# Patient Record
Sex: FEMALE | Race: WHITE | HISPANIC OR LATINO
[De-identification: names, ages, dates, MRNs, and addresses within clinical notes are randomized per-mention and may not be internally consistent; named-entity substitution may affect disease eponyms.]

---

## 2020-10-01 ENCOUNTER — OFFICE VISIT (OUTPATIENT)
Age: 55
End: 2020-10-01

## 2020-10-26 ENCOUNTER — OFFICE VISIT (OUTPATIENT)
Dept: URBAN - METROPOLITAN AREA CLINIC 7 | Facility: CLINIC | Age: 55
End: 2020-10-26

## 2020-10-27 ENCOUNTER — TELEPHONE ENCOUNTER (OUTPATIENT)
Dept: URBAN - METROPOLITAN AREA CLINIC 9 | Facility: CLINIC | Age: 55
End: 2020-10-27

## 2020-11-02 ENCOUNTER — OFFICE VISIT (OUTPATIENT)
Dept: URBAN - METROPOLITAN AREA CLINIC 7 | Facility: CLINIC | Age: 55
End: 2020-11-02

## 2020-11-11 ENCOUNTER — TELEPHONE ENCOUNTER (OUTPATIENT)
Dept: URBAN - METROPOLITAN AREA CLINIC 9 | Facility: CLINIC | Age: 55
End: 2020-11-11

## 2020-11-11 ENCOUNTER — OFFICE VISIT (OUTPATIENT)
Dept: URBAN - METROPOLITAN AREA SURGERY CENTER 5 | Facility: SURGERY CENTER | Age: 55
End: 2020-11-11

## 2020-11-19 ENCOUNTER — OFFICE VISIT (OUTPATIENT)
Dept: URBAN - METROPOLITAN AREA SURGERY CENTER 5 | Facility: SURGERY CENTER | Age: 55
End: 2020-11-19

## 2020-12-04 ENCOUNTER — OFFICE VISIT (OUTPATIENT)
Dept: URBAN - METROPOLITAN AREA SURGERY CENTER 5 | Facility: SURGERY CENTER | Age: 55
End: 2020-12-04

## 2020-12-28 ENCOUNTER — TELEPHONE ENCOUNTER (OUTPATIENT)
Dept: URBAN - METROPOLITAN AREA CLINIC 9 | Facility: CLINIC | Age: 55
End: 2020-12-28

## 2021-01-02 ENCOUNTER — TELEPHONE ENCOUNTER (OUTPATIENT)
Dept: URBAN - METROPOLITAN AREA CLINIC 9 | Facility: CLINIC | Age: 56
End: 2021-01-02

## 2021-01-06 ENCOUNTER — LAB OUTSIDE AN ENCOUNTER (OUTPATIENT)
Age: 56
End: 2021-01-06

## 2021-01-11 ENCOUNTER — LAB OUTSIDE AN ENCOUNTER (OUTPATIENT)
Age: 56
End: 2021-01-11

## 2021-01-12 ENCOUNTER — OFFICE VISIT (OUTPATIENT)
Dept: URBAN - METROPOLITAN AREA CLINIC 7 | Facility: CLINIC | Age: 56
End: 2021-01-12

## 2021-01-12 LAB — HELICOBACTER PYLORI AG, EIA, STOOL: (no result)

## 2021-01-13 LAB
CLASS: (no result)
GLIADIN (DEAMIDATED) AB (IGG): (no result)
IMMUNOGLOBULIN A: (no result)
INTERPRETATION: (no result)
TISSUE TRANSGLUTAMINASE AB, IGA: (no result)
WHEAT (F4) IGE: (no result)

## 2021-01-15 ENCOUNTER — OFFICE VISIT (OUTPATIENT)
Dept: URBAN - METROPOLITAN AREA CLINIC 7 | Facility: CLINIC | Age: 56
End: 2021-01-15

## 2021-01-27 ENCOUNTER — OFFICE VISIT (OUTPATIENT)
Dept: URBAN - METROPOLITAN AREA CLINIC 7 | Facility: CLINIC | Age: 56
End: 2021-01-27

## 2021-02-01 ENCOUNTER — OFFICE VISIT (OUTPATIENT)
Dept: URBAN - METROPOLITAN AREA CLINIC 7 | Facility: CLINIC | Age: 56
End: 2021-02-01

## 2021-02-19 ENCOUNTER — OFFICE VISIT (OUTPATIENT)
Dept: URBAN - METROPOLITAN AREA CLINIC 7 | Facility: CLINIC | Age: 56
End: 2021-02-19

## 2021-03-16 ENCOUNTER — TELEPHONE ENCOUNTER (OUTPATIENT)
Dept: URBAN - METROPOLITAN AREA CLINIC 9 | Facility: CLINIC | Age: 56
End: 2021-03-16

## 2021-09-01 ENCOUNTER — OFFICE VISIT (OUTPATIENT)
Age: 56
End: 2021-09-01

## 2021-11-01 ENCOUNTER — TELEPHONE ENCOUNTER (OUTPATIENT)
Dept: URBAN - METROPOLITAN AREA CLINIC 9 | Facility: CLINIC | Age: 56
End: 2021-11-01

## 2021-11-15 ENCOUNTER — OFFICE VISIT (OUTPATIENT)
Dept: URBAN - METROPOLITAN AREA CLINIC 7 | Facility: CLINIC | Age: 56
End: 2021-11-15

## 2021-11-15 ENCOUNTER — TELEPHONE ENCOUNTER (OUTPATIENT)
Dept: URBAN - METROPOLITAN AREA CLINIC 9 | Facility: CLINIC | Age: 56
End: 2021-11-15

## 2021-11-22 ENCOUNTER — TELEPHONE ENCOUNTER (OUTPATIENT)
Dept: URBAN - METROPOLITAN AREA CLINIC 9 | Facility: CLINIC | Age: 56
End: 2021-11-22

## 2021-12-01 ENCOUNTER — TELEPHONE ENCOUNTER (OUTPATIENT)
Dept: URBAN - METROPOLITAN AREA CLINIC 9 | Facility: CLINIC | Age: 56
End: 2021-12-01

## 2022-03-01 ENCOUNTER — TELEPHONE ENCOUNTER (OUTPATIENT)
Dept: URBAN - METROPOLITAN AREA CLINIC 9 | Facility: CLINIC | Age: 57
End: 2022-03-01

## 2022-07-30 ENCOUNTER — TELEPHONE ENCOUNTER (OUTPATIENT)
Age: 57
End: 2022-07-30

## 2022-07-30 RX ORDER — METRONIDAZOLE 250 MG/1
1 (ONE) TABLET ORAL
Qty: 0 | Refills: 2 | OUTPATIENT
Start: 2020-12-28 | End: 2021-01-11

## 2022-07-30 RX ORDER — PANTOPRAZOLE SODIUM 40 MG/1
1 (ONE) TABLET, DELAYED RELEASE ORAL
Qty: 0 | Refills: 2 | OUTPATIENT
Start: 2020-12-28 | End: 2021-01-11

## 2022-07-30 RX ORDER — DOXYCYCLINE HYCLATE 100 MG/1
1 (ONE) CAPSULE ORAL
Qty: 0 | Refills: 2 | OUTPATIENT
Start: 2020-12-28 | End: 2021-01-11

## 2022-07-30 RX ORDER — BISMUTH SUBSALICYLATE 262 MG/1
2 (TWO) TABLET, CHEWABLE ORAL
Qty: 0 | Refills: 2 | OUTPATIENT
Start: 2020-12-28 | End: 2021-01-11

## 2022-07-31 ENCOUNTER — TELEPHONE ENCOUNTER (OUTPATIENT)
Age: 57
End: 2022-07-31

## 2022-07-31 RX ORDER — RABEPRAZOLE SODIUM 20 MG/1
1 TABLET, DELAYED RELEASE ORAL
Qty: 0 | Refills: 16 | Status: ACTIVE | COMMUNITY
Start: 2021-12-01

## 2022-07-31 RX ORDER — DICYCLOMINE HYDROCHLORIDE 10 MG/1
1 (ONE) CAPSULE ORAL
Qty: 0 | Refills: 16 | Status: ACTIVE | COMMUNITY
Start: 2020-10-26

## 2022-07-31 RX ORDER — BISMUTH SUBSALICYLATE 262 MG/1
2 (TWO) TABLET, CHEWABLE ORAL
Qty: 0 | Refills: 2 | Status: ACTIVE | COMMUNITY
Start: 2020-12-28

## 2022-07-31 RX ORDER — DICYCLOMINE HYDROCHLORIDE 10 MG/1
1 (ONE) CAPSULE ORAL
Qty: 0 | Refills: 5 | Status: ACTIVE | COMMUNITY
Start: 2021-10-31

## 2022-07-31 RX ORDER — DICYCLOMINE HYDROCHLORIDE 10 MG/1
1 (ONE) CAPSULE ORAL
Qty: 0 | Refills: 2 | Status: ACTIVE | COMMUNITY
Start: 2022-03-01

## 2022-07-31 RX ORDER — METRONIDAZOLE 250 MG/1
1 (ONE) TABLET ORAL
Qty: 0 | Refills: 2 | Status: ACTIVE | COMMUNITY
Start: 2020-12-28

## 2022-07-31 RX ORDER — DOXYCYCLINE HYCLATE 100 MG/1
1 (ONE) CAPSULE ORAL
Qty: 0 | Refills: 2 | Status: ACTIVE | COMMUNITY
Start: 2020-12-28

## 2022-07-31 RX ORDER — PANTOPRAZOLE SODIUM 40 MG/1
1 (ONE) TABLET, DELAYED RELEASE ORAL
Qty: 0 | Refills: 2 | Status: ACTIVE | COMMUNITY
Start: 2020-12-28

## 2023-10-12 ENCOUNTER — TELEPHONE ENCOUNTER (OUTPATIENT)
Dept: URBAN - METROPOLITAN AREA CLINIC 7 | Facility: CLINIC | Age: 58
End: 2023-10-12

## 2023-10-12 VITALS — WEIGHT: 186 LBS | BODY MASS INDEX: 32.96 KG/M2 | HEIGHT: 63 IN

## 2023-12-04 ENCOUNTER — LAB OUTSIDE AN ENCOUNTER (OUTPATIENT)
Dept: URBAN - METROPOLITAN AREA CLINIC 7 | Facility: CLINIC | Age: 58
End: 2023-12-04

## 2023-12-04 ENCOUNTER — OFFICE VISIT (OUTPATIENT)
Dept: URBAN - METROPOLITAN AREA CLINIC 7 | Facility: CLINIC | Age: 58
End: 2023-12-04
Payer: COMMERCIAL

## 2023-12-04 VITALS
DIASTOLIC BLOOD PRESSURE: 70 MMHG | RESPIRATION RATE: 16 BRPM | SYSTOLIC BLOOD PRESSURE: 130 MMHG | HEIGHT: 63 IN | WEIGHT: 188 LBS | BODY MASS INDEX: 33.31 KG/M2 | TEMPERATURE: 97.7 F

## 2023-12-04 DIAGNOSIS — Z86.19 HISTORY OF HELICOBACTER PYLORI INFECTION: ICD-10-CM

## 2023-12-04 DIAGNOSIS — R14.0 BLOATING: ICD-10-CM

## 2023-12-04 DIAGNOSIS — R10.13 DYSPEPSIA: ICD-10-CM

## 2023-12-04 DIAGNOSIS — N20.0 KIDNEY STONE ON RIGHT SIDE: ICD-10-CM

## 2023-12-04 DIAGNOSIS — K58.2 MIXED IRRITABLE BOWEL SYNDROME: ICD-10-CM

## 2023-12-04 DIAGNOSIS — K57.90 DIVERTICULOSIS: ICD-10-CM

## 2023-12-04 DIAGNOSIS — K86.2 PANCREATIC CYST: ICD-10-CM

## 2023-12-04 PROCEDURE — 99214 OFFICE O/P EST MOD 30 MIN: CPT | Performed by: INTERNAL MEDICINE

## 2023-12-04 RX ORDER — RABEPRAZOLE SODIUM 20 MG/1
1 TABLET, DELAYED RELEASE ORAL
Qty: 0 | Refills: 16 | Status: DISCONTINUED | COMMUNITY
Start: 2021-12-01

## 2023-12-04 RX ORDER — PANTOPRAZOLE SODIUM 40 MG/1
1 (ONE) TABLET, DELAYED RELEASE ORAL
Qty: 0 | Refills: 2 | Status: DISCONTINUED | COMMUNITY
Start: 2020-12-28

## 2023-12-04 RX ORDER — DOXYCYCLINE HYCLATE 100 MG/1
1 (ONE) CAPSULE ORAL
Qty: 0 | Refills: 2 | Status: DISCONTINUED | COMMUNITY
Start: 2020-12-28

## 2023-12-04 RX ORDER — NEOMYCIN SULFATE, POLYMYXIN B SULFATE, HYDROCORTISONE 3.5; 10000; 1 MG/ML; [USP'U]/ML; MG/ML
USE 3 DROPS IN EACH EAR THREE TIMES DAILY SOLUTION/ DROPS AURICULAR (OTIC)
Qty: 10 MILLILITER | Refills: 0 | Status: DISCONTINUED | COMMUNITY

## 2023-12-04 RX ORDER — METRONIDAZOLE 250 MG/1
1 (ONE) TABLET ORAL
Qty: 0 | Refills: 2 | Status: DISCONTINUED | COMMUNITY
Start: 2020-12-28

## 2023-12-04 RX ORDER — SOLIFENACIN SUCCINATE 5 MG/1
TAKE 1 TABLET BY MOUTH EVERY DAY AS DIRECTED TABLET, FILM COATED ORAL
Qty: 90 EACH | Refills: 0 | Status: DISCONTINUED | COMMUNITY

## 2023-12-04 RX ORDER — PNV NO.95/FERROUS FUM/FOLIC AC 28MG-0.8MG
AS DIRECTED TABLET ORAL
Status: ACTIVE | COMMUNITY

## 2023-12-04 RX ORDER — AMITRIPTYLINE HYDROCHLORIDE 25 MG/1
TAKE 1 TABLET BY MOUTH EVERY NIGHT TABLET, FILM COATED ORAL
Qty: 90 EACH | Refills: 0 | Status: ACTIVE | COMMUNITY

## 2023-12-04 RX ORDER — MONTELUKAST SODIUM 10 MG/1
TABLET, FILM COATED ORAL
Qty: 90 TABLET | Status: ACTIVE | COMMUNITY

## 2023-12-04 RX ORDER — BUSPIRONE HYDROCHLORIDE 15 MG/1
TAKE 1 TABLET BY MOUTH TWICE DAILY TABLET ORAL
Qty: 60 EACH | Refills: 0 | Status: ACTIVE | COMMUNITY

## 2023-12-04 RX ORDER — METOPROLOL SUCCINATE 50 MG/1
TABLET, FILM COATED, EXTENDED RELEASE ORAL
Qty: 90 TABLET | Status: ACTIVE | COMMUNITY

## 2023-12-04 RX ORDER — CLOTRIMAZOLE AND BETAMETHASONE DIPROPIONATE 10; .5 MG/G; MG/G
APPLY TOPICALLY TO THE AFFECTED AND SURROUNDING AREAS TWICE DAILY IN THE MORNING AND IN THE EVENING FOR 2 WEEKS CREAM TOPICAL
Qty: 45 GRAM | Refills: 0 | Status: DISCONTINUED | COMMUNITY

## 2023-12-04 RX ORDER — ACETIC ACID 20.65 MG/ML
SOLUTION AURICULAR (OTIC)
Qty: 15 MILLILITER | Status: DISCONTINUED | COMMUNITY

## 2023-12-04 RX ORDER — BISMUTH SUBSALICYLATE 262 MG/1
2 (TWO) TABLET, CHEWABLE ORAL
Qty: 0 | Refills: 2 | Status: DISCONTINUED | COMMUNITY
Start: 2020-12-28

## 2023-12-04 RX ORDER — FAMOTIDINE 40 MG/1
TABLET, FILM COATED ORAL
Qty: 90 TABLET | Status: ACTIVE | COMMUNITY

## 2023-12-04 RX ORDER — TRAZODONE HYDROCHLORIDE 150 MG/1
TABLET ORAL
Qty: 180 TABLET | Status: ACTIVE | COMMUNITY

## 2023-12-04 RX ORDER — AMLODIPINE BESYLATE 5 MG/1
TABLET ORAL
Qty: 90 TABLET | Status: ACTIVE | COMMUNITY

## 2023-12-04 RX ORDER — OMEPRAZOLE 40 MG/1
CAPSULE, DELAYED RELEASE ORAL
Qty: 90 CAPSULE | Status: ACTIVE | COMMUNITY

## 2023-12-04 RX ORDER — OXCARBAZEPINE 150 MG/1
TABLET, FILM COATED ORAL
Qty: 180 TABLET | Status: DISCONTINUED | COMMUNITY

## 2023-12-04 RX ORDER — DICYCLOMINE HYDROCHLORIDE 10 MG/1
1 (ONE) CAPSULE ORAL
Qty: 0 | Refills: 2 | Status: DISCONTINUED | COMMUNITY
Start: 2022-03-01

## 2023-12-04 RX ORDER — LORAZEPAM 0.5 MG/1
TABLET ORAL
Qty: 90 TABLET | Status: ACTIVE | COMMUNITY

## 2023-12-04 NOTE — HPI-TODAY'S VISIT:
Patient was last seen in the office in November 2021.  She has been evaluated in the past for diverticulitis, as well as chronic dyspeptic symptoms and reflux.  Also has a history of colon polyps.  Colonoscopy in January 2020 with 2 polyps removed and repeat recommended in 5 years.  She has been treated with PPI, H2 receptor blockers, and sucralfate in the past.  CT scan in the hospital demonstrated a cecal diverticulum that was inflamed back in 2020 but the rest of her CT scan appeared normal.  Overall, also had chronic irritable bowel symptoms.  EGD December 2020 with a small hiatal hernia, granular stomach, small mucosal papule, normal duodenum, and biopsies with H. pylori and gastric intestinal metaplasia.  Her H. pylori was treated and negative in January 2021 but was checked too soon.  HIDA scan normal.  Mild wheat allergy and negative for celiac disease.  Her last visit, she was evaluated for lower abdominal pain which was persistent, cramping, bloating, nausea, and alternating bowel habits.  She was on PPI and famotidine but still was getting reflux symptoms.  Was also taking peppermint, Beano for gas, and dicyclomine.  Plan at that time was to recheck her H. pylori test, gluten-free diet, Beano with meals, SIBO testing, gallbladder ultrasound for surveillance of gallbladder polyps, as well as a CT abdomen pelvis given right lower quadrant abdominal pain.  Also want to use Benefiber and peppermint.  Overall picture was that of irritable bowel syndrome mixed type with functional bloating but wanted to do an evaluation to exclude other causes.  CT scan, November 2021, demonstrated no evidence of bowel inflammation, normal pancreas, no enlarged lymph nodes but did have kidney stones measuring up to 1 cm in the right kidney, an indeterminate adrenal lesion measuring 1.3 cm, and no left kidney.  I did advise referral to urology given her kidney stone as well as getting an MRI adrenal protocol for the adrenal lesion.  MRI abdomen December 2021 demonstrated benign adrenal lesions requiring no further follow-up and a small pancreatic cyst requiring an MRI pancreas for surveillance 1 year.  It is not clear that her H. pylori test was ever done, nor that SIBO testing was completed.  Follow-up now.  She is having abdominal colic every day, but no urges to have BM, epigastric discomfort, alternating bowel habits. She still has renal stones on the right side. Having multiple symptoms. She is on amitriptyline for migraines. Weight is stable.

## 2023-12-20 ENCOUNTER — CLAIMS CREATED FROM THE CLAIM WINDOW (OUTPATIENT)
Dept: URBAN - METROPOLITAN AREA SURGERY CENTER 5 | Facility: SURGERY CENTER | Age: 58
End: 2023-12-20
Payer: COMMERCIAL

## 2023-12-20 ENCOUNTER — TELEPHONE ENCOUNTER (OUTPATIENT)
Dept: URBAN - METROPOLITAN AREA CLINIC 7 | Facility: CLINIC | Age: 58
End: 2023-12-20

## 2023-12-20 ENCOUNTER — CLAIMS CREATED FROM THE CLAIM WINDOW (OUTPATIENT)
Dept: URBAN - METROPOLITAN AREA CLINIC 4 | Facility: CLINIC | Age: 58
End: 2023-12-20
Payer: COMMERCIAL

## 2023-12-20 DIAGNOSIS — K31.89 OTHER DISEASES OF STOMACH AND DUODENUM: ICD-10-CM

## 2023-12-20 DIAGNOSIS — K31.A0 GASTRIC INTESTINAL METAPLASIA, UNSPECIFIED: ICD-10-CM

## 2023-12-20 DIAGNOSIS — K29.70 GASTRITIS, UNSPECIFIED, WITHOUT BLEEDING: ICD-10-CM

## 2023-12-20 DIAGNOSIS — K29.60 OTHER GASTRITIS WITHOUT BLEEDING: ICD-10-CM

## 2023-12-20 DIAGNOSIS — R13.10 DYSPHAGIA, UNSPECIFIED TYPE: ICD-10-CM

## 2023-12-20 DIAGNOSIS — K31.89 REACTIVE GASTROPATHY: ICD-10-CM

## 2023-12-20 DIAGNOSIS — R13.19 DYSPHAGIA: ICD-10-CM

## 2023-12-20 PROCEDURE — 88342 IMHCHEM/IMCYTCHM 1ST ANTB: CPT | Performed by: PATHOLOGY

## 2023-12-20 PROCEDURE — 00731 ANES UPR GI NDSC PX NOS: CPT | Performed by: NURSE ANESTHETIST, CERTIFIED REGISTERED

## 2023-12-20 PROCEDURE — 43239 EGD BIOPSY SINGLE/MULTIPLE: CPT | Performed by: INTERNAL MEDICINE

## 2023-12-20 PROCEDURE — 88305 TISSUE EXAM BY PATHOLOGIST: CPT | Performed by: PATHOLOGY

## 2023-12-20 PROCEDURE — 43248 EGD GUIDE WIRE INSERTION: CPT | Performed by: INTERNAL MEDICINE

## 2023-12-20 PROCEDURE — 88312 SPECIAL STAINS GROUP 1: CPT | Performed by: PATHOLOGY

## 2023-12-20 RX ORDER — PNV NO.95/FERROUS FUM/FOLIC AC 28MG-0.8MG
AS DIRECTED TABLET ORAL
Status: ACTIVE | COMMUNITY

## 2023-12-20 RX ORDER — LORAZEPAM 0.5 MG/1
TABLET ORAL
Qty: 90 TABLET | Status: ACTIVE | COMMUNITY

## 2023-12-20 RX ORDER — BUSPIRONE HYDROCHLORIDE 15 MG/1
TAKE 1 TABLET BY MOUTH TWICE DAILY TABLET ORAL
Qty: 60 EACH | Refills: 0 | Status: ACTIVE | COMMUNITY

## 2023-12-20 RX ORDER — TRAZODONE HYDROCHLORIDE 150 MG/1
TABLET ORAL
Qty: 180 TABLET | Status: ACTIVE | COMMUNITY

## 2023-12-20 RX ORDER — AMLODIPINE BESYLATE 5 MG/1
TABLET ORAL
Qty: 90 TABLET | Status: ACTIVE | COMMUNITY

## 2023-12-20 RX ORDER — OMEPRAZOLE 40 MG/1
CAPSULE, DELAYED RELEASE ORAL
Qty: 90 CAPSULE | Status: ACTIVE | COMMUNITY

## 2023-12-20 RX ORDER — MONTELUKAST SODIUM 10 MG/1
TABLET, FILM COATED ORAL
Qty: 90 TABLET | Status: ACTIVE | COMMUNITY

## 2023-12-20 RX ORDER — DICYCLOMINE HYDROCHLORIDE 10 MG/1
AS DIRECTED CAPSULE ORAL
Qty: 60 | Refills: 3 | OUTPATIENT
Start: 2023-12-20 | End: 2024-04-18

## 2023-12-20 RX ORDER — AMITRIPTYLINE HYDROCHLORIDE 25 MG/1
TAKE 1 TABLET BY MOUTH EVERY NIGHT TABLET, FILM COATED ORAL
Qty: 90 EACH | Refills: 0 | Status: ACTIVE | COMMUNITY

## 2023-12-20 RX ORDER — METOPROLOL SUCCINATE 50 MG/1
TABLET, FILM COATED, EXTENDED RELEASE ORAL
Qty: 90 TABLET | Status: ACTIVE | COMMUNITY

## 2023-12-20 RX ORDER — FAMOTIDINE 40 MG/1
TABLET, FILM COATED ORAL
Qty: 90 TABLET | Status: ACTIVE | COMMUNITY

## 2023-12-21 ENCOUNTER — TELEPHONE ENCOUNTER (OUTPATIENT)
Dept: URBAN - METROPOLITAN AREA CLINIC 7 | Facility: CLINIC | Age: 58
End: 2023-12-21

## 2023-12-28 ENCOUNTER — TELEPHONE ENCOUNTER (OUTPATIENT)
Dept: URBAN - METROPOLITAN AREA CLINIC 7 | Facility: CLINIC | Age: 58
End: 2023-12-28

## 2024-01-16 ENCOUNTER — TELEPHONE ENCOUNTER (OUTPATIENT)
Dept: URBAN - METROPOLITAN AREA CLINIC 9 | Facility: CLINIC | Age: 59
End: 2024-01-16

## 2024-01-18 ENCOUNTER — TELEPHONE ENCOUNTER (OUTPATIENT)
Dept: URBAN - METROPOLITAN AREA SURGERY CENTER 9 | Facility: SURGERY CENTER | Age: 59
End: 2024-01-18

## 2024-01-19 ENCOUNTER — OFFICE VISIT (OUTPATIENT)
Dept: URBAN - METROPOLITAN AREA SURGERY CENTER 9 | Facility: SURGERY CENTER | Age: 59
End: 2024-01-19

## 2024-01-29 ENCOUNTER — CLAIMS CREATED FROM THE CLAIM WINDOW (OUTPATIENT)
Dept: URBAN - METROPOLITAN AREA SURGERY CENTER 9 | Facility: SURGERY CENTER | Age: 59
End: 2024-01-29
Payer: COMMERCIAL

## 2024-01-29 ENCOUNTER — CLAIMS CREATED FROM THE CLAIM WINDOW (OUTPATIENT)
Dept: URBAN - METROPOLITAN AREA CLINIC 4 | Facility: CLINIC | Age: 59
End: 2024-01-29
Payer: COMMERCIAL

## 2024-01-29 DIAGNOSIS — K29.60 OTHER GASTRITIS WITHOUT BLEEDING: ICD-10-CM

## 2024-01-29 DIAGNOSIS — K31.89 OTHER DISEASES OF STOMACH AND DUODENUM: ICD-10-CM

## 2024-01-29 DIAGNOSIS — K29.70 GASTRITIS WITHOUT BLEEDING, UNSPECIFIED CHRONICITY, UNSPECIFIED GASTRITIS TYPE: ICD-10-CM

## 2024-01-29 PROCEDURE — 43237 ENDOSCOPIC US EXAM ESOPH: CPT | Performed by: INTERNAL MEDICINE

## 2024-01-29 PROCEDURE — 00731 ANES UPR GI NDSC PX NOS: CPT | Performed by: NURSE ANESTHETIST, CERTIFIED REGISTERED

## 2024-01-29 PROCEDURE — 88341 IMHCHEM/IMCYTCHM EA ADD ANTB: CPT | Performed by: PATHOLOGY

## 2024-01-29 PROCEDURE — 88342 IMHCHEM/IMCYTCHM 1ST ANTB: CPT | Performed by: PATHOLOGY

## 2024-01-29 PROCEDURE — 88305 TISSUE EXAM BY PATHOLOGIST: CPT | Performed by: PATHOLOGY

## 2024-01-29 RX ORDER — AMITRIPTYLINE HYDROCHLORIDE 25 MG/1
TAKE 1 TABLET BY MOUTH EVERY NIGHT TABLET, FILM COATED ORAL
Qty: 90 EACH | Refills: 0 | Status: ACTIVE | COMMUNITY

## 2024-01-29 RX ORDER — AMLODIPINE BESYLATE 5 MG/1
TABLET ORAL
Qty: 90 TABLET | Status: ACTIVE | COMMUNITY

## 2024-01-29 RX ORDER — BUSPIRONE HYDROCHLORIDE 15 MG/1
TAKE 1 TABLET BY MOUTH TWICE DAILY TABLET ORAL
Qty: 60 EACH | Refills: 0 | Status: ACTIVE | COMMUNITY

## 2024-01-29 RX ORDER — FAMOTIDINE 40 MG/1
TABLET, FILM COATED ORAL
Qty: 90 TABLET | Status: ACTIVE | COMMUNITY

## 2024-01-29 RX ORDER — TRAZODONE HYDROCHLORIDE 150 MG/1
TABLET ORAL
Qty: 180 TABLET | Status: ACTIVE | COMMUNITY

## 2024-01-29 RX ORDER — MONTELUKAST SODIUM 10 MG/1
TABLET, FILM COATED ORAL
Qty: 90 TABLET | Status: ACTIVE | COMMUNITY

## 2024-01-29 RX ORDER — OMEPRAZOLE 40 MG/1
CAPSULE, DELAYED RELEASE ORAL
Qty: 90 CAPSULE | Status: ACTIVE | COMMUNITY

## 2024-01-29 RX ORDER — DICYCLOMINE HYDROCHLORIDE 10 MG/1
AS DIRECTED CAPSULE ORAL
Qty: 60 | Refills: 3 | Status: ACTIVE | COMMUNITY
Start: 2023-12-20 | End: 2024-04-18

## 2024-01-29 RX ORDER — PNV NO.95/FERROUS FUM/FOLIC AC 28MG-0.8MG
AS DIRECTED TABLET ORAL
Status: ACTIVE | COMMUNITY

## 2024-01-29 RX ORDER — METOPROLOL SUCCINATE 50 MG/1
TABLET, FILM COATED, EXTENDED RELEASE ORAL
Qty: 90 TABLET | Status: ACTIVE | COMMUNITY

## 2024-01-29 RX ORDER — LORAZEPAM 0.5 MG/1
TABLET ORAL
Qty: 90 TABLET | Status: ACTIVE | COMMUNITY

## 2024-04-24 ENCOUNTER — OV EP (OUTPATIENT)
Dept: URBAN - METROPOLITAN AREA CLINIC 7 | Facility: CLINIC | Age: 59
End: 2024-04-24

## 2024-04-24 RX ORDER — AMLODIPINE BESYLATE 5 MG/1
TABLET ORAL
Qty: 90 TABLET | Status: ACTIVE | COMMUNITY

## 2024-04-24 RX ORDER — METOPROLOL SUCCINATE 50 MG/1
TABLET, FILM COATED, EXTENDED RELEASE ORAL
Qty: 90 TABLET | Status: ACTIVE | COMMUNITY

## 2024-04-24 RX ORDER — OMEPRAZOLE 40 MG/1
CAPSULE, DELAYED RELEASE ORAL
Qty: 90 CAPSULE | Status: ACTIVE | COMMUNITY

## 2024-04-24 RX ORDER — PNV NO.95/FERROUS FUM/FOLIC AC 28MG-0.8MG
AS DIRECTED TABLET ORAL
Status: ACTIVE | COMMUNITY

## 2024-04-24 RX ORDER — LORAZEPAM 0.5 MG/1
TABLET ORAL
Qty: 90 TABLET | Status: ACTIVE | COMMUNITY

## 2024-04-24 RX ORDER — BUSPIRONE HYDROCHLORIDE 15 MG/1
TAKE 1 TABLET BY MOUTH TWICE DAILY TABLET ORAL
Qty: 60 EACH | Refills: 0 | Status: ACTIVE | COMMUNITY

## 2024-04-24 RX ORDER — AMITRIPTYLINE HYDROCHLORIDE 25 MG/1
TAKE 1 TABLET BY MOUTH EVERY NIGHT TABLET, FILM COATED ORAL
Qty: 90 EACH | Refills: 0 | Status: ACTIVE | COMMUNITY

## 2024-04-24 RX ORDER — MONTELUKAST SODIUM 10 MG/1
TABLET, FILM COATED ORAL
Qty: 90 TABLET | Status: ACTIVE | COMMUNITY

## 2024-04-24 RX ORDER — TRAZODONE HYDROCHLORIDE 150 MG/1
TABLET ORAL
Qty: 180 TABLET | Status: ACTIVE | COMMUNITY

## 2024-04-24 RX ORDER — FAMOTIDINE 40 MG/1
TABLET, FILM COATED ORAL
Qty: 90 TABLET | Status: ACTIVE | COMMUNITY

## 2024-04-24 NOTE — HPI-TODAY'S VISIT:
LV 12/2023. She has been evaluated in the past for diverticulitis, as well as chronic dyspeptic symptoms and reflux.  Also has a history of colon polyps.  Colonoscopy in January 2020 with 2 polyps removed and repeat recommended in 5 years.  She has been treated with PPI, H2 receptor blockers, and sucralfate in the past.  CT scan in the hospital demonstrated a cecal diverticulum that was inflamed back in 2020 but the rest of her CT scan appeared normal.  Overall, also had chronic irritable bowel symptoms.  EGD December 2020 with a small hiatal hernia, granular stomach, small mucosal papule, normal duodenum, and biopsies with H. pylori and gastric intestinal metaplasia.  Her H. pylori was treated and negative in January 2021 but was checked too soon.  HIDA scan normal.  Mild wheat allergy and negative for celiac disease.  Her last visit, she was evaluated for lower abdominal pain which was persistent, cramping, bloating, nausea, and alternating bowel habits.  She was on PPI and famotidine but still was getting reflux symptoms.  Was also taking peppermint, Beano for gas, and dicyclomine.  Plan at that time was to recheck her H. pylori test, gluten-free diet, Beano with meals, SIBO testing, gallbladder ultrasound for surveillance of gallbladder polyps, as well as a CT abdomen pelvis given right lower quadrant abdominal pain.  Also want to use Benefiber and peppermint.  Overall picture was that of irritable bowel syndrome mixed type with functional bloating but wanted to do an evaluation to exclude other causes.  CT scan, November 2021, demonstrated no evidence of bowel inflammation, normal pancreas, no enlarged lymph nodes but did have kidney stones measuring up to 1 cm in the right kidney, an indeterminate adrenal lesion measuring 1.3 cm, and no left kidney.  I did advise referral to urology given her kidney stone as well as getting an MRI adrenal protocol for the adrenal lesion.  MRI abdomen December 2021 demonstrated benign adrenal lesions requiring no further follow-up and a small pancreatic cyst requiring an MRI pancreas for surveillance 1 year.  It is not clear that her H. pylori test was ever done, nor that SIBO testing was completed. LV, she was having abdominal colic every day, but no urges to have BM, epigastric discomfort, alternating bowel habits. She still has renal stones on the right side. Having multiple symptoms. She was on amitriptyline for migraines. Weight is stable. EGD December 2023 demonstrated normal esophagus with dilation performed to 51 [place], mild erosive gastropathy, 10 mm submucosal papule in the gastric antrum, and otherwise normal stomach with biopsies taken for intestinal metaplasia, normal duodenum.  She was sent for EUS for gastric nodule.  Biopsies were negative for EOE, and negative for H. pylori.  No intestinal metaplasia was seen.  EUS was done in January 2024 which demonstrated a mucosal-based nodule on EUS.  Biopsies taken which demonstrated foveolar hyperplasia.  MRI MRCP December 2023 demonstrated hepatic steatosis, nondilated gallbladder without stones, no choledocholithiasis, lipoma within the body of the pancreas measuring 1.1 cm, stable subcentimeter cyst in the pancreatic tail, absent left kidney, no enlarged lymph nodes, no bowel inflammation.

## 2024-04-24 NOTE — PHYSICAL EXAM CHEST:
no lesions, no deformities, no traumatic injuries, no significant scars are present, chest wall non-tender, no masses present, breathing is unlabored without accessory muscle use,normal breath sounds
25

## 2024-06-28 ENCOUNTER — DASHBOARD ENCOUNTERS (OUTPATIENT)
Age: 59
End: 2024-06-28

## 2024-07-08 ENCOUNTER — OFFICE VISIT (OUTPATIENT)
Dept: URBAN - METROPOLITAN AREA CLINIC 7 | Facility: CLINIC | Age: 59
End: 2024-07-08

## 2024-07-08 RX ORDER — TRAZODONE HYDROCHLORIDE 150 MG/1
TABLET ORAL
Qty: 180 TABLET | Status: ACTIVE | COMMUNITY

## 2024-07-08 RX ORDER — LORAZEPAM 0.5 MG/1
TABLET ORAL
Qty: 90 TABLET | Status: ACTIVE | COMMUNITY

## 2024-07-08 RX ORDER — PNV NO.95/FERROUS FUM/FOLIC AC 28MG-0.8MG
AS DIRECTED TABLET ORAL
Status: ACTIVE | COMMUNITY

## 2024-07-08 RX ORDER — FAMOTIDINE 40 MG/1
TABLET, FILM COATED ORAL
Qty: 90 TABLET | Status: ACTIVE | COMMUNITY

## 2024-07-08 RX ORDER — AMITRIPTYLINE HYDROCHLORIDE 25 MG/1
TAKE 1 TABLET BY MOUTH EVERY NIGHT TABLET, FILM COATED ORAL
Qty: 90 EACH | Refills: 0 | Status: ACTIVE | COMMUNITY

## 2024-07-08 RX ORDER — AMLODIPINE BESYLATE 5 MG/1
TABLET ORAL
Qty: 90 TABLET | Status: ACTIVE | COMMUNITY

## 2024-07-08 RX ORDER — MONTELUKAST SODIUM 10 MG/1
TABLET, FILM COATED ORAL
Qty: 90 TABLET | Status: ACTIVE | COMMUNITY

## 2024-07-08 RX ORDER — METOPROLOL SUCCINATE 50 MG/1
TABLET, FILM COATED, EXTENDED RELEASE ORAL
Qty: 90 TABLET | Status: ACTIVE | COMMUNITY

## 2024-07-08 RX ORDER — OMEPRAZOLE 40 MG/1
CAPSULE, DELAYED RELEASE ORAL
Qty: 90 CAPSULE | Status: ACTIVE | COMMUNITY

## 2024-07-08 RX ORDER — BUSPIRONE HYDROCHLORIDE 15 MG/1
TAKE 1 TABLET BY MOUTH TWICE DAILY TABLET ORAL
Qty: 60 EACH | Refills: 0 | Status: ACTIVE | COMMUNITY

## 2024-07-08 NOTE — HPI-TODAY'S VISIT:
LV 12/2023. She has been evaluated in the past for diverticulitis, as well as chronic dyspeptic symptoms and reflux.  Also has a history of colon polyps.  Colonoscopy in January 2020 with 2 polyps removed and repeat recommended in 5 years.  She has been treated with PPI, H2 receptor blockers, and sucralfate in the past.  CT scan in the hospital demonstrated a cecal diverticulum that was inflamed back in 2020 but the rest of her CT scan appeared normal.  Overall, also had chronic irritable bowel symptoms.  EGD December 2020 with a small hiatal hernia, granular stomach, small mucosal papule, normal duodenum, and biopsies with H. pylori and gastric intestinal metaplasia.  Her H. pylori was treated and negative in January 2021 but was checked too soon.  HIDA scan normal.  Mild wheat allergy and negative for celiac disease.  Her last visit, she was evaluated for lower abdominal pain which was persistent, cramping, bloating, nausea, and alternating bowel habits.  She was on PPI and famotidine but still was getting reflux symptoms.  Was also taking peppermint, Beano for gas, and dicyclomine.  Plan at that time was to recheck her H. pylori test, gluten-free diet, Beano with meals, SIBO testing, gallbladder ultrasound for surveillance of gallbladder polyps, as well as a CT abdomen pelvis given right lower quadrant abdominal pain.  Also want to use Benefiber and peppermint.  Overall picture was that of irritable bowel syndrome mixed type with functional bloating but wanted to do an evaluation to exclude other causes.  CT scan, November 2021, demonstrated no evidence of bowel inflammation, normal pancreas, no enlarged lymph nodes but did have kidney stones measuring up to 1 cm in the right kidney, an indeterminate adrenal lesion measuring 1.3 cm, and no left kidney.  I did advise referral to urology given her kidney stone as well as getting an MRI adrenal protocol for the adrenal lesion.  MRI abdomen December 2021 demonstrated benign adrenal lesions requiring no further follow-up and a small pancreatic cyst requiring an MRI pancreas for surveillance 1 year.  It is not clear that her H. pylori test was ever done, nor that SIBO testing was completed. LV, she was having abdominal colic every day, but no urges to have BM, epigastric discomfort, alternating bowel habits. She still has renal stones on the right side. Having multiple symptoms. She was on amitriptyline for migraines. Weight is stable. EGD December 2023 demonstrated normal esophagus with dilation performed to 51 [place], mild erosive gastropathy, 10 mm submucosal papule in the gastric antrum, and otherwise normal stomach with biopsies taken for intestinal metaplasia, normal duodenum.  She was sent for EUS for gastric nodule.  Biopsies were negative for EOE, and negative for H. pylori.  No intestinal metaplasia was seen.  EUS was done in January 2024 which demonstrated a mucosal-based nodule on EUS.  Biopsies taken which demonstrated foveolar hyperplasia.  MRI MRCP December 2023 demonstrated hepatic steatosis, nondilated gallbladder without stones, no choledocholithiasis, lipoma within the body of the pancreas measuring 1.1 cm, stable subcentimeter cyst in the pancreatic tail, absent left kidney, no enlarged lymph nodes, no bowel inflammation.

## 2024-08-07 ENCOUNTER — OFFICE VISIT (OUTPATIENT)
Dept: URBAN - METROPOLITAN AREA CLINIC 7 | Facility: CLINIC | Age: 59
End: 2024-08-07

## 2024-08-07 RX ORDER — METOPROLOL SUCCINATE 50 MG/1
TABLET, FILM COATED, EXTENDED RELEASE ORAL
Qty: 90 TABLET | Status: ACTIVE | COMMUNITY

## 2024-08-07 RX ORDER — BUSPIRONE HYDROCHLORIDE 15 MG/1
TAKE 1 TABLET BY MOUTH TWICE DAILY TABLET ORAL
Qty: 60 EACH | Refills: 0 | Status: ACTIVE | COMMUNITY

## 2024-08-07 RX ORDER — FAMOTIDINE 40 MG/1
TABLET, FILM COATED ORAL
Qty: 90 TABLET | Status: ACTIVE | COMMUNITY

## 2024-08-07 RX ORDER — TRAZODONE HYDROCHLORIDE 150 MG/1
TABLET ORAL
Qty: 180 TABLET | Status: ACTIVE | COMMUNITY

## 2024-08-07 RX ORDER — LORAZEPAM 0.5 MG/1
TABLET ORAL
Qty: 90 TABLET | Status: ACTIVE | COMMUNITY

## 2024-08-07 RX ORDER — MONTELUKAST SODIUM 10 MG/1
TABLET, FILM COATED ORAL
Qty: 90 TABLET | Status: ACTIVE | COMMUNITY

## 2024-08-07 RX ORDER — PNV NO.95/FERROUS FUM/FOLIC AC 28MG-0.8MG
AS DIRECTED TABLET ORAL
Status: ACTIVE | COMMUNITY

## 2024-08-07 RX ORDER — AMLODIPINE BESYLATE 5 MG/1
TABLET ORAL
Qty: 90 TABLET | Status: ACTIVE | COMMUNITY

## 2024-08-07 RX ORDER — AMITRIPTYLINE HYDROCHLORIDE 25 MG/1
TAKE 1 TABLET BY MOUTH EVERY NIGHT TABLET, FILM COATED ORAL
Qty: 90 EACH | Refills: 0 | Status: ACTIVE | COMMUNITY

## 2024-08-07 RX ORDER — OMEPRAZOLE 40 MG/1
CAPSULE, DELAYED RELEASE ORAL
Qty: 90 CAPSULE | Status: ACTIVE | COMMUNITY

## 2024-08-07 NOTE — HPI-TODAY'S VISIT:
LV 12/2023. She has been evaluated in the past for diverticulitis, as well as chronic dyspeptic symptoms and reflux.  Also has a history of colon polyps.  Colonoscopy in January 2020 with 2 polyps removed and repeat recommended in 5 years.  She has been treated with PPI, H2 receptor blockers, and sucralfate in the past.  CT scan in the hospital demonstrated a cecal diverticulum that was inflamed back in 2020 but the rest of her CT scan appeared normal.  Overall, also had chronic irritable bowel symptoms.  EGD December 2020 with a small hiatal hernia, granular stomach, small mucosal papule, normal duodenum, and biopsies with H. pylori and gastric intestinal metaplasia.  Her H. pylori was treated and negative in January 2021 but was checked too soon.  HIDA scan normal.  Mild wheat allergy and negative for celiac disease.  Her last visit, she was evaluated for lower abdominal pain which was persistent, cramping, bloating, nausea, and alternating bowel habits.  She was on PPI and famotidine but still was getting reflux symptoms.  Was also taking peppermint, Beano for gas, and dicyclomine.  Plan at that time was to recheck her H. pylori test, gluten-free diet, Beano with meals, SIBO testing, gallbladder ultrasound for surveillance of gallbladder polyps, as well as a CT abdomen pelvis given right lower quadrant abdominal pain.  Also want to use Benefiber and peppermint.  Overall picture was that of irritable bowel syndrome mixed type with functional bloating but wanted to do an evaluation to exclude other causes.  CT scan, November 2021, demonstrated no evidence of bowel inflammation, normal pancreas, no enlarged lymph nodes but did have kidney stones measuring up to 1 cm in the right kidney, an indeterminate adrenal lesion measuring 1.3 cm, and no left kidney.  I did advise referral to urology given her kidney stone as well as getting an MRI adrenal protocol for the adrenal lesion.  MRI abdomen December 2021 demonstrated benign adrenal lesions requiring no further follow-up and a small pancreatic cyst requiring an MRI pancreas for surveillance 1 year.  It is not clear that her H. pylori test was ever done, nor that SIBO testing was completed. LV, she was having abdominal colic every day, but no urges to have BM, epigastric discomfort, alternating bowel habits. She still has renal stones on the right side. Having multiple symptoms. She was on amitriptyline for migraines. Weight is stable. EGD December 2023 demonstrated normal esophagus with dilation performed to 51 [place], mild erosive gastropathy, 10 mm submucosal papule in the gastric antrum, and otherwise normal stomach with biopsies taken for intestinal metaplasia, normal duodenum.  She was sent for EUS for gastric nodule.  Biopsies were negative for EOE, and negative for H. pylori.  No intestinal metaplasia was seen.  EUS was done in January 2024 which demonstrated a mucosal-based nodule on EUS.  Biopsies taken which demonstrated foveolar hyperplasia.  MRI MRCP December 2023 demonstrated hepatic steatosis, nondilated gallbladder without stones, no choledocholithiasis, lipoma within the body of the pancreas measuring 1.1 cm, stable subcentimeter cyst in the pancreatic tail, absent left kidney, no enlarged lymph nodes, no bowel inflammation.

## 2024-08-27 ENCOUNTER — TELEPHONE ENCOUNTER (OUTPATIENT)
Dept: URBAN - METROPOLITAN AREA CLINIC 7 | Facility: CLINIC | Age: 59
End: 2024-08-27

## 2024-10-20 PROBLEM — 15627741000119108: Status: ACTIVE | Noted: 2024-10-20

## 2024-10-20 PROBLEM — 72519002: Status: ACTIVE | Noted: 2024-10-20

## 2024-10-21 ENCOUNTER — OFFICE VISIT (OUTPATIENT)
Dept: URBAN - METROPOLITAN AREA CLINIC 7 | Facility: CLINIC | Age: 59
End: 2024-10-21

## 2024-10-21 RX ORDER — LORAZEPAM 0.5 MG/1
TABLET ORAL
Qty: 90 TABLET | COMMUNITY

## 2024-10-21 RX ORDER — METOPROLOL SUCCINATE 50 MG/1
TABLET, FILM COATED, EXTENDED RELEASE ORAL
Qty: 90 TABLET | COMMUNITY

## 2024-10-21 RX ORDER — BUSPIRONE HYDROCHLORIDE 15 MG/1
TAKE 1 TABLET BY MOUTH TWICE DAILY TABLET ORAL
Qty: 60 EACH | Refills: 0 | COMMUNITY

## 2024-10-21 RX ORDER — TRAZODONE HYDROCHLORIDE 150 MG/1
TABLET ORAL
Qty: 180 TABLET | COMMUNITY

## 2024-10-21 RX ORDER — FAMOTIDINE 40 MG/1
TABLET, FILM COATED ORAL
Qty: 90 TABLET | COMMUNITY

## 2024-10-21 RX ORDER — PNV NO.95/FERROUS FUM/FOLIC AC 28MG-0.8MG
AS DIRECTED TABLET ORAL
COMMUNITY

## 2024-10-21 RX ORDER — AMLODIPINE BESYLATE 5 MG/1
TABLET ORAL
Qty: 90 TABLET | COMMUNITY

## 2024-10-21 RX ORDER — AMITRIPTYLINE HYDROCHLORIDE 25 MG/1
TAKE 1 TABLET BY MOUTH EVERY NIGHT TABLET, FILM COATED ORAL
Qty: 90 EACH | Refills: 0 | COMMUNITY

## 2024-10-21 RX ORDER — OMEPRAZOLE 40 MG/1
CAPSULE, DELAYED RELEASE ORAL
Qty: 90 CAPSULE | COMMUNITY

## 2024-10-21 RX ORDER — MONTELUKAST SODIUM 10 MG/1
TABLET, FILM COATED ORAL
Qty: 90 TABLET | COMMUNITY

## 2024-10-21 NOTE — HPI-TODAY'S VISIT:
LV 12/2023. She has been evaluated in the past for diverticulitis, as well as chronic dyspeptic symptoms and reflux.  Also has a history of colon polyps.  Colonoscopy in January 2020 with 2 polyps removed and repeat recommended in 5 years.  She has been treated with PPI, H2 receptor blockers, and sucralfate in the past.  CT scan in the hospital demonstrated a cecal diverticulum that was inflamed back in 2020 but the rest of her CT scan appeared normal.  Overall, also had chronic irritable bowel symptoms.  EGD December 2020 with a small hiatal hernia, granular stomach, small mucosal papule, normal duodenum, and biopsies with H. pylori and gastric intestinal metaplasia.  Her H. pylori was treated and negative in January 2021 but was checked too soon.  HIDA scan normal.  Mild wheat allergy and negative for celiac disease.  Her last visit, she was evaluated for lower abdominal pain which was persistent, cramping, bloating, nausea, and alternating bowel habits.  She was on PPI and famotidine but still was getting reflux symptoms.  Was also taking peppermint, Beano for gas, and dicyclomine.  Plan at that time was to recheck her H. pylori test, gluten-free diet, Beano with meals, SIBO testing, gallbladder ultrasound for surveillance of gallbladder polyps, as well as a CT abdomen pelvis given right lower quadrant abdominal pain.  Also want to use Benefiber and peppermint.  Overall picture was that of irritable bowel syndrome mixed type with functional bloating but wanted to do an evaluation to exclude other causes.  CT scan, November 2021, demonstrated no evidence of bowel inflammation, normal pancreas, no enlarged lymph nodes but did have kidney stones measuring up to 1 cm in the right kidney, an indeterminate adrenal lesion measuring 1.3 cm, and no left kidney.  I did advise referral to urology given her kidney stone as well as getting an MRI adrenal protocol for the adrenal lesion.  MRI abdomen December 2021 demonstrated benign adrenal lesions requiring no further follow-up and a small pancreatic cyst requiring an MRI pancreas for surveillance 1 year.  It is not clear that her H. pylori test was ever done, nor that SIBO testing was completed. LV, she was having abdominal colic every day, but no urges to have BM, epigastric discomfort, alternating bowel habits. She still has renal stones on the right side. Having multiple symptoms. She was on amitriptyline for migraines. Weight is stable.  EGD December 2023 demonstrated normal esophagus with dilation performed to 51 [place], mild erosive gastropathy, 10 mm submucosal papule in the gastric antrum, and otherwise normal stomach with biopsies taken for intestinal metaplasia, normal duodenum.  She was sent for EUS for gastric nodule.  Biopsies were negative for EOE, and negative for H. pylori.  No intestinal metaplasia was seen.  EUS was done in January 2024 which demonstrated a mucosal-based nodule on EUS.  Biopsies taken which demonstrated foveolar hyperplasia.  MRI MRCP December 2023 demonstrated hepatic steatosis, nondilated gallbladder without stones, no choledocholithiasis, lipoma within the body of the pancreas measuring 1.1 cm, stable subcentimeter cyst in the pancreatic tail, absent left kidney, no enlarged lymph nodes, no bowel inflammation. Plan after her EGD in 2023 was repeat in 3-5 yrs for gastric IM. FU now.

## 2025-01-07 ENCOUNTER — LAB OUTSIDE AN ENCOUNTER (OUTPATIENT)
Dept: URBAN - METROPOLITAN AREA CLINIC 7 | Facility: CLINIC | Age: 60
End: 2025-01-07

## 2025-01-07 ENCOUNTER — OFFICE VISIT (OUTPATIENT)
Dept: URBAN - METROPOLITAN AREA CLINIC 7 | Facility: CLINIC | Age: 60
End: 2025-01-07
Payer: COMMERCIAL

## 2025-01-07 VITALS
WEIGHT: 200 LBS | TEMPERATURE: 97.5 F | SYSTOLIC BLOOD PRESSURE: 130 MMHG | HEART RATE: 101 BPM | BODY MASS INDEX: 35.44 KG/M2 | RESPIRATION RATE: 16 BRPM | HEIGHT: 63 IN | DIASTOLIC BLOOD PRESSURE: 70 MMHG

## 2025-01-07 DIAGNOSIS — K76.0 HEPATIC STEATOSIS: ICD-10-CM

## 2025-01-07 DIAGNOSIS — R10.13 DYSPEPSIA: ICD-10-CM

## 2025-01-07 DIAGNOSIS — N20.0 KIDNEY STONE ON RIGHT SIDE: ICD-10-CM

## 2025-01-07 DIAGNOSIS — R14.0 BLOATING: ICD-10-CM

## 2025-01-07 DIAGNOSIS — Z86.19 HISTORY OF HELICOBACTER PYLORI INFECTION: ICD-10-CM

## 2025-01-07 DIAGNOSIS — K58.2 MIXED IRRITABLE BOWEL SYNDROME: ICD-10-CM

## 2025-01-07 DIAGNOSIS — Z86.0100 PERSONAL HISTORY OF COLONIC POLYPS: ICD-10-CM

## 2025-01-07 DIAGNOSIS — K31.A0 GASTRIC INTESTINAL METAPLASIA: ICD-10-CM

## 2025-01-07 DIAGNOSIS — K57.90 DIVERTICULOSIS: ICD-10-CM

## 2025-01-07 DIAGNOSIS — K86.2 PANCREATIC CYST: ICD-10-CM

## 2025-01-07 PROCEDURE — 99214 OFFICE O/P EST MOD 30 MIN: CPT | Performed by: INTERNAL MEDICINE

## 2025-01-07 RX ORDER — PNV NO.95/FERROUS FUM/FOLIC AC 28MG-0.8MG
AS DIRECTED TABLET ORAL
Status: ACTIVE | COMMUNITY

## 2025-01-07 RX ORDER — TRAZODONE HYDROCHLORIDE 150 MG/1
TABLET ORAL
Qty: 180 TABLET | Status: ACTIVE | COMMUNITY

## 2025-01-07 RX ORDER — METOPROLOL SUCCINATE 50 MG/1
TABLET, FILM COATED, EXTENDED RELEASE ORAL
Qty: 90 TABLET | Status: ACTIVE | COMMUNITY

## 2025-01-07 RX ORDER — AMLODIPINE BESYLATE 5 MG/1
TABLET ORAL
Qty: 90 TABLET | Status: ACTIVE | COMMUNITY

## 2025-01-07 RX ORDER — DICYCLOMINE HYDROCHLORIDE 10 MG/1
TAKE 1 CAPSULE BY MOUTH THREE TIMES DAILY AS NEEDED FOR ABDOMINAL FOR PAIN CAPSULE ORAL
Qty: 180 EACH | Refills: 0 | Status: ACTIVE | COMMUNITY

## 2025-01-07 RX ORDER — FAMOTIDINE 40 MG/1
TABLET, FILM COATED ORAL
Qty: 90 TABLET | Status: ACTIVE | COMMUNITY

## 2025-01-07 RX ORDER — BUSPIRONE HYDROCHLORIDE 15 MG/1
TAKE 1 TABLET BY MOUTH TWICE DAILY TABLET ORAL
Qty: 60 EACH | Refills: 0 | Status: ACTIVE | COMMUNITY

## 2025-01-07 RX ORDER — LORAZEPAM 0.5 MG/1
TABLET ORAL
Qty: 90 TABLET | Status: ACTIVE | COMMUNITY

## 2025-01-07 RX ORDER — OMEPRAZOLE 40 MG/1
CAPSULE, DELAYED RELEASE ORAL
Qty: 90 CAPSULE | Status: ACTIVE | COMMUNITY

## 2025-01-07 RX ORDER — AMITRIPTYLINE HYDROCHLORIDE 25 MG/1
TAKE 1 TABLET BY MOUTH EVERY NIGHT TABLET, FILM COATED ORAL
Qty: 90 EACH | Refills: 0 | Status: ACTIVE | COMMUNITY

## 2025-01-07 RX ORDER — DICYCLOMINE HYDROCHLORIDE 10 MG/1
2 CAPSULES CAPSULE ORAL
Qty: 180 | Refills: 3

## 2025-01-07 RX ORDER — MONTELUKAST SODIUM 10 MG/1
TABLET, FILM COATED ORAL
Qty: 90 TABLET | Status: ACTIVE | COMMUNITY

## 2025-01-07 NOTE — PHYSICAL EXAM HENT:
Head, normocephalic, atraumatic, Face, Face within normal limits, Ears, External ears within normal limits, Nose/Nasopharynx, External nose normal appearance, nares patent, no nasal discharge, Mouth and Throat, Oral cavity appearance normal, Lips, Appearance normal
If you are a smoker, it is important for your health to stop smoking. Please be aware that second hand smoke is also harmful.

## 2025-01-07 NOTE — HPI-TODAY'S VISIT:
LV 12/2023. She has been evaluated in the past for diverticulitis, as well as chronic dyspeptic symptoms and reflux.  Also has a history of colon polyps.  Colonoscopy in January 2020 with 2 polyps removed and repeat recommended in 5 years.  She has been treated with PPI, H2 receptor blockers, and sucralfate in the past.  CT scan in the hospital demonstrated a cecal diverticulum that was inflamed back in 2020 but the rest of her CT scan appeared normal.  Overall, also had chronic irritable bowel symptoms.  EGD December 2020 with a small hiatal hernia, granular stomach, small mucosal papule, normal duodenum, and biopsies with H. pylori and gastric intestinal metaplasia.  Her H. pylori was treated and negative in January 2021 but was checked too soon.  HIDA scan normal.  Mild wheat allergy and negative for celiac disease.  Her last visit, she was evaluated for lower abdominal pain which was persistent, cramping, bloating, nausea, and alternating bowel habits.  She was on PPI and famotidine but still was getting reflux symptoms.  Was also taking peppermint, Beano for gas, and dicyclomine.  Plan at that time was to recheck her H. pylori test, gluten-free diet, Beano with meals, SIBO testing, gallbladder ultrasound for surveillance of gallbladder polyps, as well as a CT abdomen pelvis given right lower quadrant abdominal pain.  Also want to use Benefiber and peppermint.  Overall picture was that of irritable bowel syndrome mixed type with functional bloating but wanted to do an evaluation to exclude other causes.  CT scan, November 2021, demonstrated no evidence of bowel inflammation, normal pancreas, no enlarged lymph nodes but did have kidney stones measuring up to 1 cm in the right kidney, an indeterminate adrenal lesion measuring 1.3 cm, and no left kidney.  I did advise referral to urology given her kidney stone as well as getting an MRI adrenal protocol for the adrenal lesion.  MRI abdomen December 2021 demonstrated benign adrenal lesions requiring no further follow-up and a small pancreatic cyst requiring an MRI pancreas for surveillance 1 year.  It is not clear that her H. pylori test was ever done, nor that SIBO testing was completed. LV, she was having abdominal colic every day, but no urges to have BM, epigastric discomfort, alternating bowel habits. She still has renal stones on the right side. Having multiple symptoms. She was on amitriptyline for migraines. Weight is stable.  EGD December 2023 demonstrated normal esophagus with dilation performed to 51 [place], mild erosive gastropathy, 10 mm submucosal papule in the gastric antrum, and otherwise normal stomach with biopsies taken for intestinal metaplasia, normal duodenum.  She was sent for EUS for gastric nodule.  Biopsies were negative for EOE, and negative for H. pylori.  No intestinal metaplasia was seen.  EUS was done in January 2024 which demonstrated a mucosal-based nodule on EUS.  Biopsies taken which demonstrated foveolar hyperplasia.  MRI MRCP December 2023 demonstrated hepatic steatosis, nondilated gallbladder without stones, no choledocholithiasis, lipoma within the body of the pancreas measuring 1.1 cm, stable subcentimeter cyst in the pancreatic tail, absent left kidney, no enlarged lymph nodes, no bowel inflammation. Plan after her EGD in 2023 was repeat in 3-5 yrs for gastric IM. FU now. She still continues bloating, distention. She does have some irregular bowel habits, more constipated now. No alarm symptoms. She is taking antacids as well.

## 2025-01-10 ENCOUNTER — TELEPHONE ENCOUNTER (OUTPATIENT)
Dept: URBAN - METROPOLITAN AREA CLINIC 7 | Facility: CLINIC | Age: 60
End: 2025-01-10

## 2025-01-23 ENCOUNTER — OFFICE VISIT (OUTPATIENT)
Dept: URBAN - METROPOLITAN AREA CLINIC 8 | Facility: CLINIC | Age: 60
End: 2025-01-23

## 2025-02-11 ENCOUNTER — OFFICE VISIT (OUTPATIENT)
Dept: URBAN - METROPOLITAN AREA SURGERY CENTER 5 | Facility: SURGERY CENTER | Age: 60
End: 2025-02-11